# Patient Record
Sex: FEMALE | Race: WHITE | NOT HISPANIC OR LATINO
[De-identification: names, ages, dates, MRNs, and addresses within clinical notes are randomized per-mention and may not be internally consistent; named-entity substitution may affect disease eponyms.]

---

## 2020-07-21 ENCOUNTER — APPOINTMENT (OUTPATIENT)
Dept: RADIOLOGY | Facility: CLINIC | Age: 85
End: 2020-07-21

## 2021-04-19 PROBLEM — Z00.00 ENCOUNTER FOR PREVENTIVE HEALTH EXAMINATION: Status: ACTIVE | Noted: 2021-04-19

## 2021-05-17 ENCOUNTER — APPOINTMENT (OUTPATIENT)
Dept: HEMATOLOGY ONCOLOGY | Facility: CLINIC | Age: 86
End: 2021-05-17

## 2021-10-15 ENCOUNTER — APPOINTMENT (OUTPATIENT)
Dept: RADIOLOGY | Facility: CLINIC | Age: 86
End: 2021-10-15
Payer: COMMERCIAL

## 2021-10-15 ENCOUNTER — OUTPATIENT (OUTPATIENT)
Dept: OUTPATIENT SERVICES | Facility: HOSPITAL | Age: 86
LOS: 1 days | End: 2021-10-15

## 2021-10-15 PROCEDURE — 77080 DXA BONE DENSITY AXIAL: CPT | Mod: 26

## 2022-10-10 ENCOUNTER — TRANSCRIPTION ENCOUNTER (OUTPATIENT)
Age: 87
End: 2022-10-10

## 2023-06-22 ENCOUNTER — LABORATORY RESULT (OUTPATIENT)
Age: 88
End: 2023-06-22

## 2023-06-22 ENCOUNTER — NON-APPOINTMENT (OUTPATIENT)
Age: 88
End: 2023-06-22

## 2023-06-22 ENCOUNTER — APPOINTMENT (OUTPATIENT)
Dept: HEMATOLOGY ONCOLOGY | Facility: CLINIC | Age: 88
End: 2023-06-22
Payer: COMMERCIAL

## 2023-06-22 VITALS
OXYGEN SATURATION: 99 % | HEART RATE: 50 BPM | TEMPERATURE: 97.8 F | SYSTOLIC BLOOD PRESSURE: 161 MMHG | WEIGHT: 111 LBS | HEIGHT: 60 IN | DIASTOLIC BLOOD PRESSURE: 68 MMHG | RESPIRATION RATE: 18 BRPM | BODY MASS INDEX: 21.79 KG/M2

## 2023-06-22 DIAGNOSIS — I10 ESSENTIAL (PRIMARY) HYPERTENSION: ICD-10-CM

## 2023-06-22 DIAGNOSIS — I48.91 UNSPECIFIED ATRIAL FIBRILLATION: ICD-10-CM

## 2023-06-22 DIAGNOSIS — M81.0 AGE-RELATED OSTEOPOROSIS W/OUT CURRENT PATHOLOGICAL FRACTURE: ICD-10-CM

## 2023-06-22 LAB
ALBUMIN SERPL ELPH-MCNC: 4 G/DL
ALP BLD-CCNC: 66 U/L
ALT SERPL-CCNC: 15 U/L
ANION GAP SERPL CALC-SCNC: 7 MMOL/L
AST SERPL-CCNC: 25 U/L
BILIRUB SERPL-MCNC: 1.1 MG/DL
BUN SERPL-MCNC: 28 MG/DL
CALCIUM SERPL-MCNC: 9.3 MG/DL
CHLORIDE SERPL-SCNC: 104 MMOL/L
CO2 SERPL-SCNC: 27 MMOL/L
CREAT SERPL-MCNC: 0.73 MG/DL
EGFR: 80 ML/MIN/1.73M2
GLUCOSE SERPL-MCNC: 93 MG/DL
HBV CORE IGG+IGM SER QL: NONREACTIVE
HBV SURFACE AB SER QL: NONREACTIVE
HBV SURFACE AG SER QL: NONREACTIVE
HCV AB SER QL: NONREACTIVE
HCV S/CO RATIO: 0.04 S/CO
LDH SERPL-CCNC: 207 U/L
POTASSIUM SERPL-SCNC: 4.6 MMOL/L
PROT SERPL-MCNC: 6.4 G/DL
SODIUM SERPL-SCNC: 138 MMOL/L
URATE SERPL-MCNC: 5.5 MG/DL

## 2023-06-22 PROCEDURE — 99205 OFFICE O/P NEW HI 60 MIN: CPT | Mod: 25

## 2023-06-22 PROCEDURE — 36415 COLL VENOUS BLD VENIPUNCTURE: CPT

## 2023-06-22 PROCEDURE — 99203 OFFICE O/P NEW LOW 30 MIN: CPT

## 2023-06-22 RX ORDER — SPIRONOLACTONE 25 MG/1
25 TABLET ORAL
Refills: 0 | Status: ACTIVE | COMMUNITY

## 2023-06-22 RX ORDER — LOSARTAN POTASSIUM 25 MG/1
25 TABLET, FILM COATED ORAL
Refills: 0 | Status: ACTIVE | COMMUNITY

## 2023-06-22 RX ORDER — AMLODIPINE BESYLATE 2.5 MG/1
2.5 TABLET ORAL
Refills: 0 | Status: ACTIVE | COMMUNITY

## 2023-06-22 RX ORDER — APIXABAN 2.5 MG/1
2.5 TABLET, FILM COATED ORAL
Refills: 0 | Status: ACTIVE | COMMUNITY

## 2023-06-22 NOTE — ASSESSMENT
[FreeTextEntry1] : Roxana Fragoso is an 88 year old female presenting for evaluation of lymphocytosis.  Underwent an eval w/ another hematologist approximately 1 year ago, but no records are available.  She remembers the physician using the word "hairy."  Most recent CBC shows a WBC count = 51, hgb 11, platelets 79 on 6/2/23\par \par Plan\par \par 1. 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  Morphology would be most compatible w/ this.  Await rest of evaluation for results.

## 2023-06-22 NOTE — HISTORY OF PRESENT ILLNESS
[de-identified] : Mrs Roxana Fragoso is an 88 year old female referred by Dr Ortiz for elevated WBC count.\par \par The patient was first noted to have an elevated WBC count in late 2020.  She saw a hematologist sometime after that.  She does not remember where, or who.  She had extensive labs done.  SHe was diagnosed w/ a blood disease and was advised to have chemotherapy.  She says she was scared about that and decided not to follow up.  SHe remembers the doctor saying something about her cells was "hairy."\par \par She had labs w/ Dr Macias 6/2/23 - WBC 51 with lymphocyte predominance.  NO immature cells + smudge cells.  hemoglobin 11.5 and platelets 79.  creatinine, LFTs normal.\par labs 11/2023 showed WBC 25.\par \par she denies fevers, night sweats.  denies weight loss.  denies frequent/recurrent/unusual infections.  Denies fatigue.  denies abd pain, bloating.  denies adenopathy.\par \par PMH, PSH reviewed and updated in allscripts\par FMH - brother with throat ca.  no blood disease\par SH - from [place].  Never smoker.  no ETOH.  .  no children.  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

## 2023-06-22 NOTE — REVIEW OF SYSTEMS
[Diarrhea: Grade 0] : Diarrhea: Grade 0 [Suicidal] : not suicidal [Insomnia] : no insomnia [Anxiety] : anxiety [Depression] : no depression [Negative] : Allergic/Immunologic

## 2023-07-07 ENCOUNTER — APPOINTMENT (OUTPATIENT)
Dept: HEMATOLOGY ONCOLOGY | Facility: CLINIC | Age: 88
End: 2023-07-07
Payer: COMMERCIAL

## 2023-07-07 VITALS
HEIGHT: 60 IN | WEIGHT: 111 LBS | TEMPERATURE: 98.7 F | RESPIRATION RATE: 18 BRPM | BODY MASS INDEX: 21.79 KG/M2 | HEART RATE: 73 BPM | OXYGEN SATURATION: 98 % | DIASTOLIC BLOOD PRESSURE: 59 MMHG | SYSTOLIC BLOOD PRESSURE: 112 MMHG

## 2023-07-07 DIAGNOSIS — C91.40 HAIRY CELL LEUKEMIA NOT HAVING ACHIEVED REMISSION: ICD-10-CM

## 2023-07-07 PROCEDURE — 99214 OFFICE O/P EST MOD 30 MIN: CPT

## 2023-07-07 NOTE — REVIEW OF SYSTEMS
[Diarrhea: Grade 0] : Diarrhea: Grade 0 [Anxiety] : anxiety [Negative] : Allergic/Immunologic [Shortness Of Breath] : no shortness of breath [Cough] : cough [SOB on Exertion] : no shortness of breath during exertion [Suicidal] : not suicidal [Insomnia] : no insomnia [Depression] : no depression

## 2023-07-08 PROBLEM — C91.40 HAIRY CELL LEUKEMIA: Status: ACTIVE | Noted: 2023-06-22

## 2023-07-08 NOTE — ASSESSMENT
[FreeTextEntry1] : Roxana Fragoso is an 88 year old female who presented to the office for consultation in late 6/2023 for evaluation of lymphocytosis.  Underwent an eval w/ another hematologist approximately 1 year ago, but no records are available.  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  thrombocytopenia is genuine; no clumping.\par \par Plan\par 1. hairy cell leukemia\par --diagnosis, lab results  reviewed w/ patient.  Literature from the S provided for her to take home to review.\par --fortunately low (or no) symptom burden related to this condition\par --LDH normal, uric acid normal;  hepatitis B, C serologies normal.  FISH panel negative\par --Hairy cell leukemia is a rare condition.  Thrombocytopenia to this degree is an indication for treatment.  Her WBC count has also doubled in approximately 6 months.  I recommended that she seek consultation with an expert in this disease for treatment recommendations.  Standard of care would be cladribine but I am not sure how she would tolerate that treatment at her advanced age.  Hence, consultation w/ an expert in this disease is warranted.  She accepted this recommendation. Advised referral to [facility] [place].  We completed the online referral and gave her the phone # to schedule a NP appointment.  Advised her to phone us if we can assist w/ transfer of records or any other hindrances to obtaining an appointment.\par \par 2.   URI symptoms - advised covid-19 testing - if no improvement by Monday, contact PCP office.\par \par All questions answered\par no follow up scheduled since she will be seeking care outside [facility].

## 2023-07-08 NOTE — PHYSICAL EXAM
[Fully active, able to carry on all pre-disease performance without restriction] : Status 0 - Fully active, able to carry on all pre-disease performance without restriction [de-identified] : kyphotic posture [Thin] : thin [Normal] : affect appropriate [de-identified] : normal RR,  breathing pattern [de-identified] : supple [de-identified] : no edema

## 2023-07-08 NOTE — HISTORY OF PRESENT ILLNESS
[de-identified] : Mrs Roxana Fragoso is an 88 year old female referred by Dr Ortiz for elevated WBC count.\par \par The patient was first noted to have an elevated WBC count in late 2020.  She saw a hematologist sometime after that.  At the initial visit, she indicated that she did not remember where, or who.  She had extensive labs done.  SHe was diagnosed w/ a blood disease and was advised to have chemotherapy.  She says she was scared about that and decided not to follow up.  SHe remembers the doctor saying something about her cells was "hairy."\par \par She had labs w/ Dr Macias 6/2/23 - WBC 51 with lymphocyte predominance.  NO immature cells + smudge cells.  hemoglobin 11.5 and platelets 79.  creatinine, LFTs normal.\par labs 11/2023 showed WBC 25.\par \par she denies fevers, night sweats.  denies weight loss.  + frequent infections.  Denies fatigue.  denies abd pain, bloating.  denies adenopathy.\par \par PMH, PSH reviewed and updated in allscripts\par FMH - brother with throat ca.  no blood disease\par SH - from [place].  Never smoker.  no ETOH.  .  no children.  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 [de-identified] : Patient presents to clinic for follow up.  She recently was sick and had nausea for a few days and now recovered,  She reports cough, taking Robitussin with some relief. Otherwise she feels ok. weight stable.  + frequent infections\par